# Patient Record
Sex: FEMALE | Race: WHITE | NOT HISPANIC OR LATINO | Employment: UNEMPLOYED | URBAN - METROPOLITAN AREA
[De-identification: names, ages, dates, MRNs, and addresses within clinical notes are randomized per-mention and may not be internally consistent; named-entity substitution may affect disease eponyms.]

---

## 2022-06-14 DIAGNOSIS — Z82.79 FAMILY HISTORY OF CONGENITAL OR GENETIC CONDITION: Primary | ICD-10-CM

## 2022-07-29 ENCOUNTER — LAB VISIT (OUTPATIENT)
Dept: LAB | Facility: HOSPITAL | Age: 27
End: 2022-07-29
Attending: MEDICAL GENETICS

## 2022-07-29 DIAGNOSIS — Z82.79 FAMILY HISTORY OF CONGENITAL OR GENETIC CONDITION: ICD-10-CM

## 2022-07-29 PROCEDURE — 30000890 MAYO MISCELLANEOUS TEST (REFLEX): Performed by: MEDICAL GENETICS

## 2022-07-29 PROCEDURE — 81402 MOPATH PROCEDURE LEVEL 3: CPT | Performed by: MEDICAL GENETICS

## 2022-08-01 ENCOUNTER — TELEPHONE (OUTPATIENT)
Dept: GENETICS | Facility: CLINIC | Age: 27
End: 2022-08-01

## 2022-08-01 NOTE — TELEPHONE ENCOUNTER
----- Message from Steve Levine sent at 8/1/2022 11:58 AM CDT -----  Contact: Gabrielle([facility]) @ [phone]  Nicky calling from [facility] requesting to speak with nurse regarding UNIPD test that was ordered for the above patient. Please call to advise.

## 2022-08-01 NOTE — TELEPHONE ENCOUNTER
Spoke to Nicky with [facility] she called to confirm the son of this patient. Nicky also wanted to confirm the father's information as well. Informed Nicky bustos      Called mom and confirmed son's father information. Also informed mom that her son [id number] needs to have labs drawn as well. Mom informed that she was advised that the son did not need to have blood work done when they came. Mom informed that she confirmed this twice and was told that her son did not need blood work

## 2022-08-25 LAB — MAYO MISCELLANEOUS RESULT (REF): NORMAL
